# Patient Record
Sex: MALE | Race: BLACK OR AFRICAN AMERICAN | NOT HISPANIC OR LATINO | Employment: OTHER | URBAN - METROPOLITAN AREA
[De-identification: names, ages, dates, MRNs, and addresses within clinical notes are randomized per-mention and may not be internally consistent; named-entity substitution may affect disease eponyms.]

---

## 2019-10-17 ENCOUNTER — TELEPHONE (OUTPATIENT)
Dept: PODIATRY | Facility: CLINIC | Age: 59
End: 2019-10-17

## 2020-08-28 DIAGNOSIS — Z76.89 ESTABLISHING CARE WITH NEW DOCTOR, ENCOUNTER FOR: Primary | ICD-10-CM

## 2020-09-04 ENCOUNTER — HOSPITAL ENCOUNTER (OUTPATIENT)
Dept: CARDIOLOGY | Facility: HOSPITAL | Age: 60
Discharge: HOME | End: 2020-09-04
Attending: INTERNAL MEDICINE
Payer: MEDICARE

## 2020-09-04 ENCOUNTER — OFFICE VISIT (OUTPATIENT)
Dept: CARDIOLOGY | Facility: CLINIC | Age: 60
End: 2020-09-04
Attending: INTERNAL MEDICINE
Payer: MEDICARE

## 2020-09-04 VITALS
OXYGEN SATURATION: 97 % | DIASTOLIC BLOOD PRESSURE: 70 MMHG | WEIGHT: 219.56 LBS | HEART RATE: 64 BPM | SYSTOLIC BLOOD PRESSURE: 116 MMHG

## 2020-09-04 DIAGNOSIS — Z76.89 ESTABLISHING CARE WITH NEW DOCTOR, ENCOUNTER FOR: ICD-10-CM

## 2020-09-04 DIAGNOSIS — Z95.810 ICD (IMPLANTABLE CARDIOVERTER-DEFIBRILLATOR) IN PLACE: ICD-10-CM

## 2020-09-04 DIAGNOSIS — I25.118 CORONARY ARTERY DISEASE OF NATIVE ARTERY OF NATIVE HEART WITH STABLE ANGINA PECTORIS: ICD-10-CM

## 2020-09-04 DIAGNOSIS — I50.22 CHRONIC SYSTOLIC CONGESTIVE HEART FAILURE: ICD-10-CM

## 2020-09-04 DIAGNOSIS — Z95.810 ICD (IMPLANTABLE CARDIOVERTER-DEFIBRILLATOR) IN PLACE: Primary | ICD-10-CM

## 2020-09-04 DIAGNOSIS — F14.10 COCAINE ABUSE: ICD-10-CM

## 2020-09-04 DIAGNOSIS — I23.6 LV (LEFT VENTRICULAR) MURAL THROMBUS FOLLOWING MI: ICD-10-CM

## 2020-09-04 PROCEDURE — 93010 EKG 12-LEAD: ICD-10-PCS | Mod: ,,, | Performed by: INTERNAL MEDICINE

## 2020-09-04 PROCEDURE — 99999 PR PBB SHADOW E&M-EST. PATIENT-LVL III: ICD-10-PCS | Mod: PBBFAC,,, | Performed by: INTERNAL MEDICINE

## 2020-09-04 PROCEDURE — 93010 ELECTROCARDIOGRAM REPORT: CPT | Mod: ,,, | Performed by: INTERNAL MEDICINE

## 2020-09-04 PROCEDURE — 99999 PR PBB SHADOW E&M-EST. PATIENT-LVL III: CPT | Mod: PBBFAC,,, | Performed by: INTERNAL MEDICINE

## 2020-09-04 PROCEDURE — 99204 OFFICE O/P NEW MOD 45 MIN: CPT | Mod: S$GLB,,, | Performed by: INTERNAL MEDICINE

## 2020-09-04 PROCEDURE — 93005 ELECTROCARDIOGRAM TRACING: CPT

## 2020-09-04 PROCEDURE — 99204 PR OFFICE/OUTPT VISIT, NEW, LEVL IV, 45-59 MIN: ICD-10-PCS | Mod: S$GLB,,, | Performed by: INTERNAL MEDICINE

## 2020-09-04 RX ORDER — CITALOPRAM 20 MG/1
20 TABLET, FILM COATED ORAL
COMMUNITY
Start: 2019-12-12 | End: 2020-09-04 | Stop reason: SDUPTHER

## 2020-09-04 RX ORDER — CARVEDILOL 25 MG/1
TABLET ORAL
COMMUNITY
Start: 2015-02-23 | End: 2021-01-11 | Stop reason: SDUPTHER

## 2020-09-04 RX ORDER — FLUTICASONE PROPIONATE 50 MCG
SPRAY, SUSPENSION (ML) NASAL
COMMUNITY
Start: 2017-02-10

## 2020-09-04 RX ORDER — TAMSULOSIN HYDROCHLORIDE 0.4 MG/1
0.4 CAPSULE ORAL
COMMUNITY
Start: 2019-12-12

## 2020-09-04 RX ORDER — ASPIRIN 81 MG/1
81 TABLET ORAL
COMMUNITY

## 2020-09-04 RX ORDER — ESCITALOPRAM OXALATE 20 MG/1
20 TABLET ORAL DAILY
COMMUNITY
Start: 2020-08-21

## 2020-09-04 RX ORDER — CETIRIZINE HYDROCHLORIDE 10 MG/1
10 TABLET ORAL
COMMUNITY
End: 2020-09-04 | Stop reason: SDUPTHER

## 2020-09-04 RX ORDER — ATORVASTATIN CALCIUM 40 MG/1
40 TABLET, FILM COATED ORAL
COMMUNITY
Start: 2016-10-13 | End: 2021-05-28

## 2020-09-04 RX ORDER — TALC
3 POWDER (GRAM) TOPICAL
COMMUNITY
Start: 2019-03-29

## 2020-09-04 RX ORDER — HYDROXYZINE HYDROCHLORIDE 50 MG/1
50 TABLET, FILM COATED ORAL
COMMUNITY
Start: 2013-10-02

## 2020-09-22 ENCOUNTER — HOSPITAL ENCOUNTER (OUTPATIENT)
Dept: CARDIOLOGY | Facility: HOSPITAL | Age: 60
Discharge: HOME | End: 2020-09-22
Attending: INTERNAL MEDICINE
Payer: MEDICARE

## 2020-09-22 VITALS
SYSTOLIC BLOOD PRESSURE: 116 MMHG | BODY MASS INDEX: 28.76 KG/M2 | HEIGHT: 73 IN | HEART RATE: 65 BPM | WEIGHT: 217 LBS | DIASTOLIC BLOOD PRESSURE: 70 MMHG

## 2020-09-22 DIAGNOSIS — Z95.810 ICD (IMPLANTABLE CARDIOVERTER-DEFIBRILLATOR) IN PLACE: ICD-10-CM

## 2020-09-22 DIAGNOSIS — I50.22 CHRONIC SYSTOLIC CONGESTIVE HEART FAILURE: ICD-10-CM

## 2020-09-22 DIAGNOSIS — I25.118 CORONARY ARTERY DISEASE OF NATIVE ARTERY OF NATIVE HEART WITH STABLE ANGINA PECTORIS: ICD-10-CM

## 2020-09-22 LAB
AORTIC ROOT ANNULUS: 3.37 CM
ASCENDING AORTA: 2.91 CM
AV INDEX (PROSTH): 0.86
AV MEAN GRADIENT: 3 MMHG
AV PEAK GRADIENT: 6 MMHG
AV VALVE AREA: 3.3 CM2
AV VELOCITY RATIO: 0.85
BSA FOR ECHO PROCEDURE: 2.25 M2
CV ECHO LV RWT: 0.38 CM
DOP CALC AO PEAK VEL: 1.19 M/S
DOP CALC AO VTI: 24.59 CM
DOP CALC LVOT AREA: 3.8 CM2
DOP CALC LVOT DIAMETER: 2.21 CM
DOP CALC LVOT PEAK VEL: 1.01 M/S
DOP CALC LVOT STROKE VOLUME: 81.05 CM3
DOP CALC RVOT PEAK VEL: 0.76 M/S
DOP CALC RVOT VTI: 15.94 CM
DOP CALCLVOT PEAK VEL VTI: 21.14 CM
E WAVE DECELERATION TIME: 177.16 MSEC
E/A RATIO: 0.97
E/E' RATIO: 9.29 M/S
ECHO LV POSTERIOR WALL: 1.05 CM (ref 0.6–1.1)
FRACTIONAL SHORTENING: 22 % (ref 28–44)
INTERVENTRICULAR SEPTUM: 1.18 CM (ref 0.6–1.1)
IVRT: 94.2 MSEC
LA MAJOR: 5.19 CM
LA MINOR: 4.81 CM
LA WIDTH: 4.1 CM
LEFT ATRIUM SIZE: 3.8 CM
LEFT ATRIUM VOLUME INDEX: 29.7 ML/M2
LEFT ATRIUM VOLUME: 66.12 CM3
LEFT INTERNAL DIMENSION IN SYSTOLE: 4.24 CM (ref 2.1–4)
LEFT VENTRICLE DIASTOLIC VOLUME INDEX: 65.25 ML/M2
LEFT VENTRICLE DIASTOLIC VOLUME: 145.36 ML
LEFT VENTRICLE MASS INDEX: 110 G/M2
LEFT VENTRICLE SYSTOLIC VOLUME INDEX: 36.1 ML/M2
LEFT VENTRICLE SYSTOLIC VOLUME: 80.34 ML
LEFT VENTRICULAR INTERNAL DIMENSION IN DIASTOLE: 5.47 CM (ref 3.5–6)
LEFT VENTRICULAR MASS: 244.26 G
LV LATERAL E/E' RATIO: 9.29 M/S
LV SEPTAL E/E' RATIO: 9.29 M/S
MV PEAK A VEL: 0.67 M/S
MV PEAK E VEL: 0.65 M/S
MV STENOSIS PRESSURE HALF TIME: 51.38 MS
MV VALVE AREA P 1/2 METHOD: 4.28 CM2
PISA TR MAX VEL: 2.44 M/S
PULM VEIN S/D RATIO: 1.13
PV MEAN GRADIENT: 1.42 MMHG
PV PEAK D VEL: 0.47 M/S
PV PEAK S VEL: 0.53 M/S
PV PEAK VELOCITY: 1.02 CM/S
RA MAJOR: 4.84 CM
RA PRESSURE: 3 MMHG
RA WIDTH: 4.08 CM
RIGHT VENTRICULAR END-DIASTOLIC DIMENSION: 4.18 CM
SINUS: 3.59 CM
STJ: 2.73 CM
TDI LATERAL: 0.07 M/S
TDI SEPTAL: 0.07 M/S
TDI: 0.07 M/S
TR MAX PG: 24 MMHG
TV REST PULMONARY ARTERY PRESSURE: 27 MMHG

## 2020-09-22 PROCEDURE — 93283 PRGRMG EVAL IMPLANTABLE DFB: CPT | Mod: 26,,, | Performed by: INTERNAL MEDICINE

## 2020-09-22 PROCEDURE — 93306 TTE W/DOPPLER COMPLETE: CPT | Mod: 26,,, | Performed by: INTERNAL MEDICINE

## 2020-09-22 PROCEDURE — 93283 CARDIAC DEVICE CHECK - IN CLINIC & HOSPITAL: ICD-10-PCS | Mod: 26,,, | Performed by: INTERNAL MEDICINE

## 2020-09-22 PROCEDURE — 93283 PRGRMG EVAL IMPLANTABLE DFB: CPT

## 2020-09-22 PROCEDURE — 93306 ECHO (CUPID ONLY): ICD-10-PCS | Mod: 26,,, | Performed by: INTERNAL MEDICINE

## 2020-09-22 PROCEDURE — 93306 TTE W/DOPPLER COMPLETE: CPT

## 2020-09-23 LAB
AV DELAY - LONGEST: 225 MSEC
AV DELAY - SHORTEST: 200 MSEC
CHARGE TIME (SEC): 8.3 SEC
HV IMPEDANCE (OHM): 48 OHM
IMPEDANCE RA LEAD (NATIVE): 390 OHMS
IMPEDANCE RA LEAD: 530 OHMS
OHS CV DC PP MS1: 0.5 MS
OHS CV DC PP MS2: 0.5 MS
OHS CV DC PP V1: 2 V
OHS CV DC PP V2: 2 V
P/R-WAVE RA LEAD (NATIVE): 11 MV
P/R-WAVE RA LEAD: 2.8 MV
THRESHOLD MS RA LEAD (NATIVE): 0.5 MS
THRESHOLD MS RA LEAD: 0.5 MS
THRESHOLD V RA LEAD (NATIVE): 1 V
THRESHOLD V RA LEAD: 0.75 V

## 2020-09-24 ENCOUNTER — TELEPHONE (OUTPATIENT)
Dept: CARDIOLOGY | Facility: CLINIC | Age: 60
End: 2020-09-24

## 2020-09-24 NOTE — TELEPHONE ENCOUNTER
Pt was contacted about results :    ECHO SHOWED EF25% AND MILD AI     Instructed pt to CONTINUE CURRENT Rx AND F/U AS SCHEDULED. Pt verbalized understanding with no questions or concerns.

## 2020-09-24 NOTE — TELEPHONE ENCOUNTER
Pt was contacted about Lipid/LDL results. Pt verbalized understanding with no questions or concerns.         ----- Message from Kavon Ferrari MD sent at 9/23/2020  9:21 PM CDT -----  LIPID CONTROLLED   LDL 72

## 2020-09-28 ENCOUNTER — PATIENT MESSAGE (OUTPATIENT)
Dept: CARDIOLOGY | Facility: CLINIC | Age: 60
End: 2020-09-28

## 2020-11-20 ENCOUNTER — PATIENT MESSAGE (OUTPATIENT)
Dept: CARDIOLOGY | Facility: CLINIC | Age: 60
End: 2020-11-20

## 2020-11-23 ENCOUNTER — TELEPHONE (OUTPATIENT)
Dept: CARDIOLOGY | Facility: CLINIC | Age: 60
End: 2020-11-23

## 2020-11-23 NOTE — TELEPHONE ENCOUNTER
It appears that this pt is going to need a clearance to have a colonscopy done on 12-. He will need to be off his blood thinners 5 days prior to procedure.please advise thanks pb

## 2020-11-24 ENCOUNTER — TELEPHONE (OUTPATIENT)
Dept: CARDIOLOGY | Facility: CLINIC | Age: 60
End: 2020-11-24

## 2020-11-24 NOTE — TELEPHONE ENCOUNTER
Ok for the procedure  Ok to hold ASA 7 days and ELiuis 2 days before the procedure and pleas resume post OP

## 2021-01-08 ENCOUNTER — PATIENT MESSAGE (OUTPATIENT)
Dept: CARDIOLOGY | Facility: CLINIC | Age: 61
End: 2021-01-08

## 2021-01-11 DIAGNOSIS — I50.22 CHRONIC SYSTOLIC CONGESTIVE HEART FAILURE: ICD-10-CM

## 2021-01-11 DIAGNOSIS — Z95.810 ICD (IMPLANTABLE CARDIOVERTER-DEFIBRILLATOR) IN PLACE: Primary | ICD-10-CM

## 2021-01-11 RX ORDER — CARVEDILOL 25 MG/1
25 TABLET ORAL 2 TIMES DAILY
Qty: 180 TABLET | Refills: 3 | Status: SHIPPED | OUTPATIENT
Start: 2021-01-11 | End: 2021-12-14 | Stop reason: SDUPTHER

## 2021-03-05 ENCOUNTER — OFFICE VISIT (OUTPATIENT)
Dept: CARDIOLOGY | Facility: CLINIC | Age: 61
End: 2021-03-05
Payer: MEDICARE

## 2021-03-05 ENCOUNTER — HOSPITAL ENCOUNTER (OUTPATIENT)
Dept: CARDIOLOGY | Facility: HOSPITAL | Age: 61
Discharge: HOME | End: 2021-03-05
Attending: INTERNAL MEDICINE
Payer: MEDICARE

## 2021-03-05 VITALS
SYSTOLIC BLOOD PRESSURE: 124 MMHG | BODY MASS INDEX: 29.79 KG/M2 | OXYGEN SATURATION: 99 % | WEIGHT: 224.75 LBS | DIASTOLIC BLOOD PRESSURE: 70 MMHG | HEART RATE: 89 BPM | HEIGHT: 73 IN

## 2021-03-05 DIAGNOSIS — Z95.810 ICD (IMPLANTABLE CARDIOVERTER-DEFIBRILLATOR) IN PLACE: ICD-10-CM

## 2021-03-05 DIAGNOSIS — I23.6 LV (LEFT VENTRICULAR) MURAL THROMBUS FOLLOWING MI: ICD-10-CM

## 2021-03-05 DIAGNOSIS — I25.118 CORONARY ARTERY DISEASE OF NATIVE ARTERY OF NATIVE HEART WITH STABLE ANGINA PECTORIS: ICD-10-CM

## 2021-03-05 DIAGNOSIS — I50.22 CHRONIC SYSTOLIC CONGESTIVE HEART FAILURE: Primary | ICD-10-CM

## 2021-03-05 DIAGNOSIS — I50.22 CHRONIC SYSTOLIC CONGESTIVE HEART FAILURE: ICD-10-CM

## 2021-03-05 DIAGNOSIS — F14.10 COCAINE ABUSE: ICD-10-CM

## 2021-03-05 LAB
AV DELAY - LONGEST: 225 MSEC
AV DELAY - SHORTEST: 200 MSEC
CHARGE TIME (SEC): 8.4 SEC
HV IMPEDANCE (OHM): 50 OHM
IMPEDANCE RA LEAD (NATIVE): 360 OHMS
IMPEDANCE RA LEAD: 510 OHMS
OHS CV DC PP MS1: 0.5 MS
OHS CV DC PP MS2: 0.5 MS
OHS CV DC PP V1: NORMAL V
OHS CV DC PP V2: NORMAL V
P/R-WAVE RA LEAD (NATIVE): 9.9 MV
P/R-WAVE RA LEAD: 1.5 MV
THRESHOLD MS RA LEAD (NATIVE): 0.5 MS
THRESHOLD MS RA LEAD: 0.5 MS
THRESHOLD V RA LEAD (NATIVE): 0.75 V
THRESHOLD V RA LEAD: 0.75 V

## 2021-03-05 PROCEDURE — 99999 PR PBB SHADOW E&M-EST. PATIENT-LVL IV: ICD-10-PCS | Mod: PBBFAC,,, | Performed by: INTERNAL MEDICINE

## 2021-03-05 PROCEDURE — 1126F AMNT PAIN NOTED NONE PRSNT: CPT | Mod: S$GLB,,, | Performed by: INTERNAL MEDICINE

## 2021-03-05 PROCEDURE — 3008F BODY MASS INDEX DOCD: CPT | Mod: CPTII,S$GLB,, | Performed by: INTERNAL MEDICINE

## 2021-03-05 PROCEDURE — 93283 CARDIAC DEVICE CHECK - IN CLINIC & HOSPITAL: ICD-10-PCS | Mod: 26,,, | Performed by: INTERNAL MEDICINE

## 2021-03-05 PROCEDURE — 93283 PRGRMG EVAL IMPLANTABLE DFB: CPT

## 2021-03-05 PROCEDURE — 99214 OFFICE O/P EST MOD 30 MIN: CPT | Mod: S$GLB,,, | Performed by: INTERNAL MEDICINE

## 2021-03-05 PROCEDURE — 1126F PR PAIN SEVERITY QUANTIFIED, NO PAIN PRESENT: ICD-10-PCS | Mod: S$GLB,,, | Performed by: INTERNAL MEDICINE

## 2021-03-05 PROCEDURE — 99214 PR OFFICE/OUTPT VISIT, EST, LEVL IV, 30-39 MIN: ICD-10-PCS | Mod: S$GLB,,, | Performed by: INTERNAL MEDICINE

## 2021-03-05 PROCEDURE — 99999 PR PBB SHADOW E&M-EST. PATIENT-LVL IV: CPT | Mod: PBBFAC,,, | Performed by: INTERNAL MEDICINE

## 2021-03-05 PROCEDURE — 93283 PRGRMG EVAL IMPLANTABLE DFB: CPT | Mod: 26,,, | Performed by: INTERNAL MEDICINE

## 2021-03-05 PROCEDURE — 3008F PR BODY MASS INDEX (BMI) DOCUMENTED: ICD-10-PCS | Mod: CPTII,S$GLB,, | Performed by: INTERNAL MEDICINE

## 2021-03-15 ENCOUNTER — TELEPHONE (OUTPATIENT)
Dept: RESEARCH | Facility: HOSPITAL | Age: 61
End: 2021-03-15

## 2021-03-15 DIAGNOSIS — Z00.6 RESEARCH EXAM: Primary | ICD-10-CM

## 2021-03-19 ENCOUNTER — RESEARCH ENCOUNTER (OUTPATIENT)
Dept: RESEARCH | Facility: HOSPITAL | Age: 61
End: 2021-03-19

## 2021-03-19 ENCOUNTER — LAB VISIT (OUTPATIENT)
Dept: LAB | Facility: HOSPITAL | Age: 61
End: 2021-03-19
Attending: INTERNAL MEDICINE
Payer: MEDICARE

## 2021-03-19 ENCOUNTER — RESEARCH ENCOUNTER (OUTPATIENT)
Dept: RESEARCH | Facility: CLINIC | Age: 61
End: 2021-03-19
Payer: MEDICARE

## 2021-03-19 VITALS — BODY MASS INDEX: 30.15 KG/M2 | WEIGHT: 227.5 LBS | HEIGHT: 73 IN

## 2021-03-19 DIAGNOSIS — Z00.6 RESEARCH STUDY PATIENT: Primary | ICD-10-CM

## 2021-03-19 DIAGNOSIS — Z00.6 RESEARCH EXAM: ICD-10-CM

## 2021-03-19 LAB
DRUG STUDY TEST NAME: NORMAL
DRUG STUDY TEST RESULT: NORMAL

## 2021-03-19 PROCEDURE — 36415 COLL VENOUS BLD VENIPUNCTURE: CPT | Performed by: INTERNAL MEDICINE

## 2021-03-29 ENCOUNTER — CLINICAL SUPPORT (OUTPATIENT)
Dept: CARDIOLOGY | Facility: HOSPITAL | Age: 61
End: 2021-03-29
Payer: MEDICARE

## 2021-03-29 DIAGNOSIS — Z95.810 PRESENCE OF AUTOMATIC (IMPLANTABLE) CARDIAC DEFIBRILLATOR: ICD-10-CM

## 2021-03-29 PROCEDURE — 93295 DEV INTERROG REMOTE 1/2/MLT: CPT | Mod: S$GLB,,, | Performed by: INTERNAL MEDICINE

## 2021-03-29 PROCEDURE — 93295 CARDIAC DEVICE CHECK - REMOTE: ICD-10-PCS | Mod: S$GLB,,, | Performed by: INTERNAL MEDICINE

## 2021-03-29 PROCEDURE — 93296 REM INTERROG EVL PM/IDS: CPT | Performed by: INTERNAL MEDICINE

## 2021-04-16 ENCOUNTER — LAB VISIT (OUTPATIENT)
Dept: LAB | Facility: HOSPITAL | Age: 61
End: 2021-04-16
Attending: INTERNAL MEDICINE
Payer: MEDICARE

## 2021-04-16 ENCOUNTER — RESEARCH ENCOUNTER (OUTPATIENT)
Dept: RESEARCH | Facility: CLINIC | Age: 61
End: 2021-04-16
Payer: MEDICARE

## 2021-04-16 DIAGNOSIS — Z00.6 RESEARCH EXAM: ICD-10-CM

## 2021-04-16 LAB
DRUG STUDY TEST NAME: NORMAL
DRUG STUDY TEST RESULT: NORMAL

## 2021-04-16 PROCEDURE — 36415 COLL VENOUS BLD VENIPUNCTURE: CPT | Performed by: INTERNAL MEDICINE

## 2021-05-27 ENCOUNTER — RESEARCH ENCOUNTER (OUTPATIENT)
Dept: RESEARCH | Facility: CLINIC | Age: 61
End: 2021-05-27

## 2021-06-16 ENCOUNTER — LAB VISIT (OUTPATIENT)
Dept: LAB | Facility: HOSPITAL | Age: 61
End: 2021-06-16
Attending: INTERNAL MEDICINE
Payer: MEDICARE

## 2021-06-16 ENCOUNTER — RESEARCH ENCOUNTER (OUTPATIENT)
Dept: RESEARCH | Facility: CLINIC | Age: 61
End: 2021-06-16
Payer: MEDICARE

## 2021-06-16 DIAGNOSIS — Z00.6 RESEARCH EXAM: ICD-10-CM

## 2021-06-16 LAB
DRUG STUDY TEST NAME: NORMAL
DRUG STUDY TEST RESULT: NORMAL

## 2021-06-16 PROCEDURE — 36415 COLL VENOUS BLD VENIPUNCTURE: CPT | Performed by: INTERNAL MEDICINE

## 2021-06-27 ENCOUNTER — CLINICAL SUPPORT (OUTPATIENT)
Dept: CARDIOLOGY | Facility: HOSPITAL | Age: 61
End: 2021-06-27
Payer: MEDICARE

## 2021-06-27 DIAGNOSIS — Z95.810 PRESENCE OF AUTOMATIC (IMPLANTABLE) CARDIAC DEFIBRILLATOR: ICD-10-CM

## 2021-06-27 PROCEDURE — 93295 DEV INTERROG REMOTE 1/2/MLT: CPT | Mod: S$GLB,,, | Performed by: INTERNAL MEDICINE

## 2021-06-27 PROCEDURE — 93296 REM INTERROG EVL PM/IDS: CPT | Performed by: INTERNAL MEDICINE

## 2021-06-27 PROCEDURE — 93295 CARDIAC DEVICE CHECK - REMOTE: ICD-10-PCS | Mod: S$GLB,,, | Performed by: INTERNAL MEDICINE

## 2021-07-07 PROBLEM — F41.1 GAD (GENERALIZED ANXIETY DISORDER): Status: ACTIVE | Noted: 2019-03-29

## 2021-07-30 ENCOUNTER — TELEPHONE (OUTPATIENT)
Dept: CARDIOLOGY | Facility: HOSPITAL | Age: 61
End: 2021-07-30

## 2021-07-30 ENCOUNTER — TELEPHONE (OUTPATIENT)
Dept: CARDIOLOGY | Facility: CLINIC | Age: 61
End: 2021-07-30

## 2021-08-02 ENCOUNTER — TELEPHONE (OUTPATIENT)
Dept: RESEARCH | Facility: HOSPITAL | Age: 61
End: 2021-08-02

## 2021-08-02 ENCOUNTER — DOCUMENTATION ONLY (OUTPATIENT)
Dept: RESEARCH | Facility: HOSPITAL | Age: 61
End: 2021-08-02

## 2021-08-02 ENCOUNTER — LAB VISIT (OUTPATIENT)
Dept: LAB | Facility: HOSPITAL | Age: 61
End: 2021-08-02
Attending: INTERNAL MEDICINE
Payer: MEDICARE

## 2021-08-02 ENCOUNTER — RESEARCH ENCOUNTER (OUTPATIENT)
Dept: RESEARCH | Facility: CLINIC | Age: 61
End: 2021-08-02
Payer: MEDICARE

## 2021-08-02 DIAGNOSIS — Z00.6 RESEARCH EXAM: ICD-10-CM

## 2021-08-02 DIAGNOSIS — Z00.6 RESEARCH EXAM: Primary | ICD-10-CM

## 2021-08-02 LAB
DRUG STUDY TEST NAME: NORMAL
DRUG STUDY TEST RESULT: NORMAL

## 2021-08-02 PROCEDURE — 36415 COLL VENOUS BLD VENIPUNCTURE: CPT | Performed by: INTERNAL MEDICINE

## 2021-08-04 ENCOUNTER — TELEPHONE (OUTPATIENT)
Dept: RESEARCH | Facility: HOSPITAL | Age: 61
End: 2021-08-04

## 2021-08-04 ENCOUNTER — DOCUMENTATION ONLY (OUTPATIENT)
Dept: RESEARCH | Facility: HOSPITAL | Age: 61
End: 2021-08-04

## 2021-08-04 DIAGNOSIS — Z00.6 RESEARCH EXAM: Primary | ICD-10-CM

## 2021-08-13 ENCOUNTER — TELEPHONE (OUTPATIENT)
Dept: CARDIOLOGY | Facility: HOSPITAL | Age: 61
End: 2021-08-13

## 2021-09-24 ENCOUNTER — RESEARCH ENCOUNTER (OUTPATIENT)
Dept: RESEARCH | Facility: HOSPITAL | Age: 61
End: 2021-09-24

## 2021-09-25 ENCOUNTER — CLINICAL SUPPORT (OUTPATIENT)
Dept: CARDIOLOGY | Facility: HOSPITAL | Age: 61
End: 2021-09-25
Payer: MEDICARE

## 2021-09-25 DIAGNOSIS — Z95.810 PRESENCE OF AUTOMATIC (IMPLANTABLE) CARDIAC DEFIBRILLATOR: ICD-10-CM

## 2021-09-25 PROCEDURE — 93295 CARDIAC DEVICE CHECK - REMOTE: ICD-10-PCS | Mod: S$GLB,,, | Performed by: INTERNAL MEDICINE

## 2021-09-25 PROCEDURE — 93295 DEV INTERROG REMOTE 1/2/MLT: CPT | Mod: S$GLB,,, | Performed by: INTERNAL MEDICINE

## 2021-09-25 PROCEDURE — 93296 REM INTERROG EVL PM/IDS: CPT | Performed by: INTERNAL MEDICINE

## 2021-10-01 DIAGNOSIS — Z76.89 ESTABLISHING CARE WITH NEW DOCTOR, ENCOUNTER FOR: Primary | ICD-10-CM

## 2021-10-28 ENCOUNTER — IMMUNIZATION (OUTPATIENT)
Dept: PRIMARY CARE CLINIC | Facility: CLINIC | Age: 61
End: 2021-10-28
Payer: MEDICAID

## 2021-10-28 ENCOUNTER — LAB VISIT (OUTPATIENT)
Dept: LAB | Facility: HOSPITAL | Age: 61
End: 2021-10-28
Attending: INTERNAL MEDICINE
Payer: MEDICARE

## 2021-10-28 ENCOUNTER — RESEARCH ENCOUNTER (OUTPATIENT)
Dept: RESEARCH | Facility: CLINIC | Age: 61
End: 2021-10-28
Payer: MEDICAID

## 2021-10-28 DIAGNOSIS — Z23 NEED FOR VACCINATION: Primary | ICD-10-CM

## 2021-10-28 DIAGNOSIS — Z00.6 RESEARCH EXAM: ICD-10-CM

## 2021-10-28 LAB
DRUG STUDY TEST NAME: NORMAL
DRUG STUDY TEST RESULT: NORMAL

## 2021-10-28 PROCEDURE — 99000 SPECIMEN HANDLING OFFICE-LAB: CPT | Performed by: INTERNAL MEDICINE

## 2021-10-28 PROCEDURE — 0031A COVID-19,VECTOR-NR,RS-AD26,PF,0.5 ML DOSE VACCINE (JANSSEN): CPT | Mod: PBBFAC,CV19 | Performed by: FAMILY MEDICINE

## 2021-11-05 ENCOUNTER — TELEPHONE (OUTPATIENT)
Dept: CARDIOLOGY | Facility: CLINIC | Age: 61
End: 2021-11-05
Payer: MEDICAID

## 2021-11-05 DIAGNOSIS — I50.22 CHRONIC SYSTOLIC CONGESTIVE HEART FAILURE: Primary | ICD-10-CM

## 2021-11-28 ENCOUNTER — PATIENT MESSAGE (OUTPATIENT)
Dept: CARDIOLOGY | Facility: CLINIC | Age: 61
End: 2021-11-28
Payer: MEDICAID

## 2021-12-14 ENCOUNTER — HOSPITAL ENCOUNTER (OUTPATIENT)
Dept: CARDIOLOGY | Facility: HOSPITAL | Age: 61
Discharge: HOME | End: 2021-12-14
Attending: INTERNAL MEDICINE
Payer: MEDICARE

## 2021-12-14 ENCOUNTER — HOSPITAL ENCOUNTER (OUTPATIENT)
Dept: RADIOLOGY | Facility: HOSPITAL | Age: 61
Discharge: HOME | End: 2021-12-14
Attending: INTERNAL MEDICINE
Payer: MEDICARE

## 2021-12-14 ENCOUNTER — OFFICE VISIT (OUTPATIENT)
Dept: CARDIOLOGY | Facility: CLINIC | Age: 61
End: 2021-12-14
Payer: MEDICARE

## 2021-12-14 VITALS
WEIGHT: 220 LBS | BODY MASS INDEX: 29.03 KG/M2 | OXYGEN SATURATION: 98 % | HEART RATE: 92 BPM | SYSTOLIC BLOOD PRESSURE: 132 MMHG | DIASTOLIC BLOOD PRESSURE: 70 MMHG

## 2021-12-14 DIAGNOSIS — I50.22 CHRONIC SYSTOLIC CONGESTIVE HEART FAILURE: ICD-10-CM

## 2021-12-14 DIAGNOSIS — Z95.810 ICD (IMPLANTABLE CARDIOVERTER-DEFIBRILLATOR) IN PLACE: ICD-10-CM

## 2021-12-14 DIAGNOSIS — I48.0 PAROXYSMAL ATRIAL FIBRILLATION: ICD-10-CM

## 2021-12-14 DIAGNOSIS — R05.9 COUGH: ICD-10-CM

## 2021-12-14 DIAGNOSIS — R07.89 OTHER CHEST PAIN: ICD-10-CM

## 2021-12-14 DIAGNOSIS — F14.10 COCAINE ABUSE: ICD-10-CM

## 2021-12-14 DIAGNOSIS — I25.118 CORONARY ARTERY DISEASE OF NATIVE ARTERY OF NATIVE HEART WITH STABLE ANGINA PECTORIS: Primary | ICD-10-CM

## 2021-12-14 DIAGNOSIS — Z76.89 ESTABLISHING CARE WITH NEW DOCTOR, ENCOUNTER FOR: ICD-10-CM

## 2021-12-14 PROCEDURE — 99999 PR PBB SHADOW E&M-EST. PATIENT-LVL III: CPT | Mod: PBBFAC,,, | Performed by: INTERNAL MEDICINE

## 2021-12-14 PROCEDURE — 99214 OFFICE O/P EST MOD 30 MIN: CPT | Mod: S$GLB,,, | Performed by: INTERNAL MEDICINE

## 2021-12-14 PROCEDURE — 4010F ACE/ARB THERAPY RXD/TAKEN: CPT | Mod: CPTII,S$GLB,, | Performed by: INTERNAL MEDICINE

## 2021-12-14 PROCEDURE — 93005 ELECTROCARDIOGRAM TRACING: CPT

## 2021-12-14 PROCEDURE — 4010F PR ACE/ARB THEARPY RXD/TAKEN: ICD-10-PCS | Mod: CPTII,S$GLB,, | Performed by: INTERNAL MEDICINE

## 2021-12-14 PROCEDURE — 99999 PR PBB SHADOW E&M-EST. PATIENT-LVL III: ICD-10-PCS | Mod: PBBFAC,,, | Performed by: INTERNAL MEDICINE

## 2021-12-14 PROCEDURE — 71046 XR CHEST PA AND LATERAL: ICD-10-PCS | Mod: 26,,, | Performed by: RADIOLOGY

## 2021-12-14 PROCEDURE — 71046 X-RAY EXAM CHEST 2 VIEWS: CPT | Mod: TC

## 2021-12-14 PROCEDURE — 93010 ELECTROCARDIOGRAM REPORT: CPT | Mod: ,,, | Performed by: INTERNAL MEDICINE

## 2021-12-14 PROCEDURE — 99214 PR OFFICE/OUTPT VISIT, EST, LEVL IV, 30-39 MIN: ICD-10-PCS | Mod: S$GLB,,, | Performed by: INTERNAL MEDICINE

## 2021-12-14 PROCEDURE — 93010 EKG 12-LEAD: ICD-10-PCS | Mod: ,,, | Performed by: INTERNAL MEDICINE

## 2021-12-14 PROCEDURE — 71046 X-RAY EXAM CHEST 2 VIEWS: CPT | Mod: 26,,, | Performed by: RADIOLOGY

## 2021-12-14 RX ORDER — CARVEDILOL 12.5 MG/1
12.5 TABLET ORAL 2 TIMES DAILY
Qty: 180 TABLET | Refills: 2 | Status: SHIPPED | OUTPATIENT
Start: 2021-12-14 | End: 2022-10-21

## 2021-12-14 RX ORDER — LEVOFLOXACIN 500 MG/1
500 TABLET, FILM COATED ORAL DAILY
Qty: 10 TABLET | Refills: 0 | Status: SHIPPED | OUTPATIENT
Start: 2021-12-14 | End: 2021-12-24

## 2021-12-14 RX ORDER — ATORVASTATIN CALCIUM 40 MG/1
40 TABLET, FILM COATED ORAL DAILY
Qty: 90 TABLET | Refills: 2 | Status: SHIPPED | OUTPATIENT
Start: 2021-12-14 | End: 2022-12-14

## 2021-12-15 ENCOUNTER — TELEPHONE (OUTPATIENT)
Dept: CARDIOLOGY | Facility: CLINIC | Age: 61
End: 2021-12-15
Payer: MEDICAID

## 2021-12-24 ENCOUNTER — CLINICAL SUPPORT (OUTPATIENT)
Dept: CARDIOLOGY | Facility: HOSPITAL | Age: 61
End: 2021-12-24
Payer: MEDICARE

## 2021-12-24 DIAGNOSIS — Z95.810 PRESENCE OF AUTOMATIC (IMPLANTABLE) CARDIAC DEFIBRILLATOR: ICD-10-CM

## 2021-12-24 PROCEDURE — 93296 REM INTERROG EVL PM/IDS: CPT | Performed by: INTERNAL MEDICINE

## 2021-12-24 PROCEDURE — 93295 CARDIAC DEVICE CHECK - REMOTE: ICD-10-PCS | Mod: S$GLB,,, | Performed by: INTERNAL MEDICINE

## 2021-12-24 PROCEDURE — 93295 DEV INTERROG REMOTE 1/2/MLT: CPT | Mod: S$GLB,,, | Performed by: INTERNAL MEDICINE

## 2022-01-11 ENCOUNTER — OFFICE VISIT (OUTPATIENT)
Dept: CARDIOLOGY | Facility: CLINIC | Age: 62
End: 2022-01-11
Payer: MEDICAID

## 2022-01-11 VITALS
BODY MASS INDEX: 28.21 KG/M2 | DIASTOLIC BLOOD PRESSURE: 72 MMHG | OXYGEN SATURATION: 97 % | SYSTOLIC BLOOD PRESSURE: 116 MMHG | HEART RATE: 77 BPM | WEIGHT: 213.88 LBS

## 2022-01-11 DIAGNOSIS — F14.10 COCAINE ABUSE: ICD-10-CM

## 2022-01-11 DIAGNOSIS — I25.118 CORONARY ARTERY DISEASE OF NATIVE ARTERY OF NATIVE HEART WITH STABLE ANGINA PECTORIS: ICD-10-CM

## 2022-01-11 DIAGNOSIS — I48.0 PAROXYSMAL ATRIAL FIBRILLATION: ICD-10-CM

## 2022-01-11 DIAGNOSIS — I23.6 LV (LEFT VENTRICULAR) MURAL THROMBUS FOLLOWING MI: ICD-10-CM

## 2022-01-11 DIAGNOSIS — Z95.810 ICD (IMPLANTABLE CARDIOVERTER-DEFIBRILLATOR) IN PLACE: ICD-10-CM

## 2022-01-11 DIAGNOSIS — I50.22 CHRONIC SYSTOLIC CONGESTIVE HEART FAILURE: Primary | ICD-10-CM

## 2022-01-11 PROCEDURE — 99214 OFFICE O/P EST MOD 30 MIN: CPT | Mod: S$GLB,,, | Performed by: INTERNAL MEDICINE

## 2022-01-11 PROCEDURE — 1160F PR REVIEW ALL MEDS BY PRESCRIBER/CLIN PHARMACIST DOCUMENTED: ICD-10-PCS | Mod: CPTII,S$GLB,, | Performed by: INTERNAL MEDICINE

## 2022-01-11 PROCEDURE — 3078F DIAST BP <80 MM HG: CPT | Mod: CPTII,S$GLB,, | Performed by: INTERNAL MEDICINE

## 2022-01-11 PROCEDURE — 99999 PR PBB SHADOW E&M-EST. PATIENT-LVL III: CPT | Mod: PBBFAC,,, | Performed by: INTERNAL MEDICINE

## 2022-01-11 PROCEDURE — 1159F PR MEDICATION LIST DOCUMENTED IN MEDICAL RECORD: ICD-10-PCS | Mod: CPTII,S$GLB,, | Performed by: INTERNAL MEDICINE

## 2022-01-11 PROCEDURE — 99214 PR OFFICE/OUTPT VISIT, EST, LEVL IV, 30-39 MIN: ICD-10-PCS | Mod: S$GLB,,, | Performed by: INTERNAL MEDICINE

## 2022-01-11 PROCEDURE — 3008F BODY MASS INDEX DOCD: CPT | Mod: CPTII,S$GLB,, | Performed by: INTERNAL MEDICINE

## 2022-01-11 PROCEDURE — 3078F PR MOST RECENT DIASTOLIC BLOOD PRESSURE < 80 MM HG: ICD-10-PCS | Mod: CPTII,S$GLB,, | Performed by: INTERNAL MEDICINE

## 2022-01-11 PROCEDURE — 3074F SYST BP LT 130 MM HG: CPT | Mod: CPTII,S$GLB,, | Performed by: INTERNAL MEDICINE

## 2022-01-11 PROCEDURE — 1159F MED LIST DOCD IN RCRD: CPT | Mod: CPTII,S$GLB,, | Performed by: INTERNAL MEDICINE

## 2022-01-11 PROCEDURE — 99999 PR PBB SHADOW E&M-EST. PATIENT-LVL III: ICD-10-PCS | Mod: PBBFAC,,, | Performed by: INTERNAL MEDICINE

## 2022-01-11 PROCEDURE — 3074F PR MOST RECENT SYSTOLIC BLOOD PRESSURE < 130 MM HG: ICD-10-PCS | Mod: CPTII,S$GLB,, | Performed by: INTERNAL MEDICINE

## 2022-01-11 PROCEDURE — 1160F RVW MEDS BY RX/DR IN RCRD: CPT | Mod: CPTII,S$GLB,, | Performed by: INTERNAL MEDICINE

## 2022-01-11 PROCEDURE — 3008F PR BODY MASS INDEX (BMI) DOCUMENTED: ICD-10-PCS | Mod: CPTII,S$GLB,, | Performed by: INTERNAL MEDICINE

## 2022-01-11 PROCEDURE — 99213 OFFICE O/P EST LOW 20 MIN: CPT | Mod: PBBFAC | Performed by: INTERNAL MEDICINE

## 2022-01-11 RX ORDER — SPIRONOLACTONE 25 MG/1
25 TABLET ORAL DAILY
Qty: 30 TABLET | Refills: 11 | Status: SHIPPED | OUTPATIENT
Start: 2022-01-11 | End: 2022-02-10

## 2022-01-11 NOTE — PROGRESS NOTES
"Subjective:   Patient ID:  Aden Norton is a 61 y.o. male who presents for follow up of No chief complaint on file.      58 yo male, came in for 4 weeks. Prior cardiologist at [facility], Dr. Ciera Castrejon  [facility] CAD s/p PCIs, S/p ICD battery change in . CHFrEF PAF, active cocaine smoking. No tabacco and alcohol.   Lives in a rehab group house.  Per [facility] records: h/o a large MI in 2009 at [place] which resulted in an EF of 20% with a 4.7 x 3 cm apical clot, LAD stent placement, implanted ([facility]) cardiac defibrillator in [place] in 2011 (no ICD shocks); In 2013 had another LAD stent His 9/15/15 at [facility] (DR Cindy Fermin) showed: Enlarged LV; occluded LAD stent, distal LAD fills retrogradely via LCX, which itself is OK, RCA OK. RA=19/13/12, sats 65%; RV=34/5/11; PA= 28/12; sats 65%Ur=406/75/95 sats 95%; VR=490/15/29; C.I. =1.17 L/min/m2.   No DmMand stroke  Last stress test in    EKG a pacing and PVC  No chest pain, dyspnea palpitation, dizziness faint  Decent exercise  Med compliance  Dependent medical transportation     visit c/o BP dropped to 86 mmHG for few hours when taking coreg and Entresto  No chest pain dyspnea palpitation., no leg swelling  Walks a lot.     12/2021 visit   ECHO EF 25% and mild AI  Two weeks ago, had cold and congestion. Coughing a lot. Sputum clear and green. Recent episode of his HR was "170" and his BP "201/100 high.  Went to ER OLOL and CXR base space disease.   and troponin 0.05, Given Lasix IVP.  F/u at PCP and repeat CXR clear.   ICD interrogation in  showed 1% AF burden  Still a lot of cough and chest pain  EKG NSR LAE LAFB  Pt used crack smoking/Cocaine one week before the cold. Used 3 times in 3 months    Interval history  PNA improved after levaquin rx and now some residual coughing and sputum. Improved SOB. Started to walk. No active bleeding  No leg swelling orthopnea pND and chest pain. Weight " loss      03/22/2022  Increase Micardia to 80 mg daily  Increase Hydralazine to 100 mg 3 times a day  Increazse Labetalol to 400 gm twice a day  Add Isodil 20 mg 3 times day  Contiune Amlodipine 10 mg daily  Add Clonidine 0.1 mg bid as needed ONLY SBP > 180 mmHg    History reviewed. No pertinent past medical history.    Past Surgical History:   Procedure Laterality Date    INSERT / REPLACE / REMOVE PACEMAKER         Social History     Tobacco Use    Smoking status: Never Smoker    Smokeless tobacco: Never Used   Substance Use Topics    Alcohol use: Never       Family History   Problem Relation Age of Onset    Heart attack Father          Review of Systems   Constitutional: Negative for decreased appetite, diaphoresis, fever, malaise/fatigue and night sweats.   HENT: Negative for nosebleeds.    Eyes: Negative for blurred vision and double vision.   Cardiovascular: Negative for chest pain, claudication, dyspnea on exertion, irregular heartbeat, leg swelling, near-syncope, orthopnea, palpitations, paroxysmal nocturnal dyspnea and syncope.   Respiratory: Negative for cough, shortness of breath, sleep disturbances due to breathing, snoring, sputum production and wheezing.    Endocrine: Negative for cold intolerance and polyuria.   Hematologic/Lymphatic: Does not bruise/bleed easily.   Skin: Negative for rash.   Musculoskeletal: Negative for back pain, falls, joint pain, joint swelling and neck pain.   Gastrointestinal: Negative for abdominal pain, heartburn, nausea and vomiting.   Genitourinary: Negative for dysuria, frequency and hematuria.   Neurological: Negative for difficulty with concentration, dizziness, focal weakness, headaches, light-headedness, numbness, seizures and weakness.   Psychiatric/Behavioral: Negative for depression, memory loss and substance abuse. The patient does not have insomnia.    Allergic/Immunologic: Negative for HIV exposure and hives.       Objective:   Physical Exam  HENT:      Head:  Normocephalic.   Eyes:      Pupils: Pupils are equal, round, and reactive to light.   Neck:      Thyroid: No thyromegaly.      Vascular: Normal carotid pulses. No carotid bruit or JVD.   Cardiovascular:      Rate and Rhythm: Normal rate and regular rhythm.  No extrasystoles are present.     Chest Wall: PMI is not displaced.      Pulses: Normal pulses.      Heart sounds: Normal heart sounds. No murmur heard.  No gallop. No S3 sounds.    Pulmonary:      Effort: No respiratory distress.      Breath sounds: No stridor. Examination of the right-lower field reveals rales. Rales present.   Chest:   Breasts:      Right: Tenderness (right lower rib cage) present.       Abdominal:      General: Bowel sounds are normal.      Palpations: Abdomen is soft.      Tenderness: There is no abdominal tenderness. There is no rebound.   Musculoskeletal:         General: Normal range of motion.   Skin:     Findings: No rash.   Neurological:      Mental Status: He is alert and oriented to person, place, and time.   Psychiatric:         Behavior: Behavior normal.         Lab Results   Component Value Date    CHOL 122 09/22/2020     Lab Results   Component Value Date    HDL 36 (L) 09/22/2020     Lab Results   Component Value Date    LDLCALC 72.8 09/22/2020     Lab Results   Component Value Date    TRIG 66 09/22/2020     Lab Results   Component Value Date    CHOLHDL 29.5 09/22/2020       Chemistry        Component Value Date/Time     09/22/2020 0849    K 4.2 09/22/2020 0849     09/22/2020 0849    CO2 23 09/22/2020 0849    BUN 14 09/22/2020 0849    CREATININE 1.2 09/22/2020 0849     09/22/2020 0849        Component Value Date/Time    CALCIUM 8.3 (L) 09/22/2020 0849    ALKPHOS 54 (L) 09/22/2020 0849    AST 21 09/22/2020 0849    ALT 19 09/22/2020 0849    BILITOT 0.5 09/22/2020 0849    ESTGFRAFRICA >60.0 09/22/2020 0849    EGFRNONAA >60.0 09/22/2020 0849          No results found for: LABA1C, HGBA1C  No results found for:  TSH  No results found for: INR, PROTIME  Lab Results   Component Value Date    WBC 5.74 09/22/2020    HGB 14.6 09/22/2020    HCT 47.1 09/22/2020    MCV 89 09/22/2020     09/22/2020     BMP  Sodium   Date Value Ref Range Status   09/22/2020 140 136 - 145 mmol/L Final     Potassium   Date Value Ref Range Status   09/22/2020 4.2 3.5 - 5.1 mmol/L Final     Chloride   Date Value Ref Range Status   09/22/2020 108 95 - 110 mmol/L Final     CO2   Date Value Ref Range Status   09/22/2020 23 23 - 29 mmol/L Final     BUN   Date Value Ref Range Status   09/22/2020 14 6 - 20 mg/dL Final     Creatinine   Date Value Ref Range Status   09/22/2020 1.2 0.5 - 1.4 mg/dL Final     Calcium   Date Value Ref Range Status   09/22/2020 8.3 (L) 8.7 - 10.5 mg/dL Final     Anion Gap   Date Value Ref Range Status   09/22/2020 9 8 - 16 mmol/L Final     eGFR if    Date Value Ref Range Status   09/22/2020 >60.0 >60 mL/min/1.73 m^2 Final     eGFR if non    Date Value Ref Range Status   09/22/2020 >60.0 >60 mL/min/1.73 m^2 Final     Comment:     Calculation used to obtain the estimated glomerular filtration  rate (eGFR) is the CKD-EPI equation.        BNP  @LABRCNTIP(BNP,BNPTRIAGEBLO)@  @LABRCNTIP(troponini)@  CrCl cannot be calculated (Patient's most recent lab result is older than the maximum 7 days allowed.).  No results found in the last 24 hours.  No results found in the last 24 hours.  No results found in the last 24 hours.    Assessment:      1. Chronic systolic congestive heart failure    2. LV (left ventricular) mural thrombus following MI    3. Coronary artery disease of native artery of native heart with stable angina pectoris    4. ICD (implantable cardioverter-defibrillator) in place    5. Paroxysmal atrial fibrillation    6. Cocaine abuse      CHFrEF 25% s/p ICD compensated  BP controlled    Plan:   Add aldactone 25 mg for CHFrEF. Will discuss SGLT2 inhibitor at next visit  Continue entresto and  coreg  Continue ASA eliquis and Lipitor    Counseled DASH and fluid restriction  Check Lipid profile in 6 months  Recommend heart-healthy diet, weight control and regular exercise.  Corey. Risk modification.   I have reviewed all pertinent labs and cardiac studies independently. Plans and recommendations have been formulated under my direct supervision. All questions answered and patient voiced understanding.   If symptoms persist go to the ED  RTC in 2 months

## 2022-01-20 ENCOUNTER — TELEPHONE (OUTPATIENT)
Dept: CARDIOLOGY | Facility: CLINIC | Age: 62
End: 2022-01-20
Payer: MEDICAID

## 2022-01-20 NOTE — TELEPHONE ENCOUNTER
"----- Message from Varghese Lay RN sent at 1/20/2022  1:54 PM CST -----  Regarding: RVR  Hi Dr. Ferrari,    I received the following alert on this pt:    "Alert: High V. Rates during AT/AF, Exceeded AT/AF burden, Long AT/AF episodes, PMT. Report reviewed. No new clinically actionable events identified.  209 AMS episodes, AT/AF burden 1.1%. Longest on 1/4/22, 4hr 28min  duration, peak V Rate 194 bpm. No EGM available for this episode. Available EGMs illustrate AF/AFL with RVR. Histograms show predominant V-rates during AMS >160 bpm.  23 Non-sustained episodes, longest on 1/14/22, 12sec duration, average rate 205 bpm. No EGM available for this episode. Available EGMs illustrate AF/AFL with RVR.   Corvue illustrates decrease in TI trend for up to 11 consecutive days, trends have normalized.  AF w/RVR > 110 bpm for > 6 mins."      Longest recorded episode on 1/14/22 was 1hr 4mins, but more likely pt was in same episode and just undersensing AF/AFL for a total of 6.5hrs due to multiple short recordings that do not show and end of atrial event and are undersensing. Pt reports taking Carvedilol and Eliquis. Pt states he was likely sleeping during the events and has been feeling well. Overall v-rates controlled on histogram. I wanted to make you aware.     Thanks,  Varghese Vega    "

## 2022-01-24 ENCOUNTER — OFFICE VISIT (OUTPATIENT)
Dept: OPHTHALMOLOGY | Facility: CLINIC | Age: 62
End: 2022-01-24
Payer: MEDICAID

## 2022-01-24 DIAGNOSIS — H52.4 PRESBYOPIA OF BOTH EYES: ICD-10-CM

## 2022-01-24 DIAGNOSIS — H31.002 CHORIORETINAL SCAR OF LEFT EYE: Primary | ICD-10-CM

## 2022-01-24 PROCEDURE — 92015 PR REFRACTION: ICD-10-PCS | Mod: S$GLB,,, | Performed by: OPTOMETRIST

## 2022-01-24 PROCEDURE — 92004 COMPRE OPH EXAM NEW PT 1/>: CPT | Mod: S$GLB,,, | Performed by: OPTOMETRIST

## 2022-01-24 PROCEDURE — 92250 COLOR FUNDUS PHOTOGRAPHY - OU - BOTH EYES: ICD-10-PCS | Mod: S$GLB,,, | Performed by: OPTOMETRIST

## 2022-01-24 PROCEDURE — 99999 PR PBB SHADOW E&M-EST. PATIENT-LVL III: ICD-10-PCS | Mod: PBBFAC,,, | Performed by: OPTOMETRIST

## 2022-01-24 PROCEDURE — 2023F PR DILATED RETINAL EXAM W/O EVID OF RETINOPATHY: ICD-10-PCS | Mod: CPTII,S$GLB,, | Performed by: OPTOMETRIST

## 2022-01-24 PROCEDURE — 92004 PR EYE EXAM, NEW PATIENT,COMPREHESV: ICD-10-PCS | Mod: S$GLB,,, | Performed by: OPTOMETRIST

## 2022-01-24 PROCEDURE — 99213 OFFICE O/P EST LOW 20 MIN: CPT | Mod: PBBFAC | Performed by: OPTOMETRIST

## 2022-01-24 PROCEDURE — 1159F PR MEDICATION LIST DOCUMENTED IN MEDICAL RECORD: ICD-10-PCS | Mod: CPTII,S$GLB,, | Performed by: OPTOMETRIST

## 2022-01-24 PROCEDURE — 92015 DETERMINE REFRACTIVE STATE: CPT | Mod: S$GLB,,, | Performed by: OPTOMETRIST

## 2022-01-24 PROCEDURE — 2023F DILAT RTA XM W/O RTNOPTHY: CPT | Mod: CPTII,S$GLB,, | Performed by: OPTOMETRIST

## 2022-01-24 PROCEDURE — 1159F MED LIST DOCD IN RCRD: CPT | Mod: CPTII,S$GLB,, | Performed by: OPTOMETRIST

## 2022-01-24 PROCEDURE — 99999 PR PBB SHADOW E&M-EST. PATIENT-LVL III: CPT | Mod: PBBFAC,,, | Performed by: OPTOMETRIST

## 2022-01-24 PROCEDURE — 92250 FUNDUS PHOTOGRAPHY W/I&R: CPT | Mod: PBBFAC | Performed by: OPTOMETRIST

## 2022-01-24 RX ORDER — CARVEDILOL 25 MG/1
12.5 TABLET ORAL
COMMUNITY
End: 2022-10-21

## 2022-01-24 NOTE — PROGRESS NOTES
HPI     NP to DKT   Pt here for annual eye exam.  Last eye exam 2021  Vision changes since last eye exam?: No     Any eye pain today: No    Other ocular symptoms: No    Interested in contact lens fitting today? No    Pt states had an injury to OS when he was a child.            Last edited by Magda Lovett MA on 1/24/2022  9:17 AM. (History)            Assessment /Plan     For exam results, see Encounter Report.    Chorioretinal scar of left eye  -     Color Fundus Photography - OU - Both Eyes  Longstanding, 2/2 eye injury as a child. Observe.     Presbyopia of both eyes  Eyeglass Final Rx     Eyeglass Final Rx       Sphere Dist [facility]    Right +2.25     Left +2.25     Type: Reading only           Eyeglass Final Rx #2       Sphere Dist [facility]    Right [place] +2.50    Left [place] +2.50    Type: PAL              RTC 1 year for CEE with DFE sooner if any changes to vision or worsening symptoms.

## 2022-03-10 DIAGNOSIS — I48.0 PAROXYSMAL ATRIAL FIBRILLATION: Primary | ICD-10-CM

## 2022-03-15 ENCOUNTER — TELEPHONE (OUTPATIENT)
Dept: CARDIOLOGY | Facility: HOSPITAL | Age: 62
End: 2022-03-15
Payer: MEDICAID

## 2022-03-15 ENCOUNTER — PATIENT MESSAGE (OUTPATIENT)
Dept: CARDIOLOGY | Facility: HOSPITAL | Age: 62
End: 2022-03-15
Payer: MEDICAID

## 2022-03-22 ENCOUNTER — TELEPHONE (OUTPATIENT)
Dept: CARDIOLOGY | Facility: CLINIC | Age: 62
End: 2022-03-22
Payer: MEDICAID

## 2022-03-22 DIAGNOSIS — I50.22 CHRONIC SYSTOLIC CONGESTIVE HEART FAILURE: Primary | ICD-10-CM

## 2022-03-22 NOTE — TELEPHONE ENCOUNTER
"----- Message from Varghese Vega RN sent at 3/22/2022  9:10 AM CDT -----  Regarding: ATP  Dr. Quintana, I sent the following report to you in [place].    Dr. Ferrari, this is your patient and the following [place] alert I received:       "Alert: VT/VF episode occurred, Successful ATP therapy delivered  1 VT episode 3/21/22, 18sec recorded duration, average A/V rates 200/201 bpm. EGM illustrates SVT inappropriately treated with ATP x1,  with no onset of tachycardia recorded due to TCL falling below detection. Indeterminate true duration. ATP did decrease A/V rate 141 bpm.    27 AMS episodes, AT/AF burden <1%. Longest on 3/16/22, 55min 16sec duration, average A/V Rate 138 bpm. EGMs illustrate EGM illustrates SVT with no onset or termination of tachycardia recorded. Indeterminate true duration. Burst of pAF noted lasting 1.2sec.     Corvue illustrates decrease in TI trend for 12 consecutive days, trends have normalized.    SVT inappropriately treated with ATP."       Called pt for symptom check. He states he was raking leaves at time of ATP. Pt states he felt fine and had to take a break sometimes. Pt states, "I don't know if I take breaks because I'm just lazy from not doing anything or what. I've been under a lot of stress. My mother had stomach cancer, went on hospice and passed so fast. Her  is next week." Roughly 15% of the time, ventricular rates >110 bpm. Pt reports taking coreg and eliquis. Pt has 2mos f/u to discuss SOB at rest wtih Dr. Ferrari on 22.    Thanks,  Varghese Vega    "

## 2022-03-24 ENCOUNTER — CLINICAL SUPPORT (OUTPATIENT)
Dept: CARDIOLOGY | Facility: HOSPITAL | Age: 62
End: 2022-03-24
Payer: MEDICARE

## 2022-03-24 DIAGNOSIS — Z95.810 PRESENCE OF AUTOMATIC (IMPLANTABLE) CARDIAC DEFIBRILLATOR: ICD-10-CM

## 2022-03-24 PROCEDURE — 93296 REM INTERROG EVL PM/IDS: CPT | Performed by: INTERNAL MEDICINE

## 2022-05-03 ENCOUNTER — TELEPHONE (OUTPATIENT)
Dept: CARDIOLOGY | Facility: HOSPITAL | Age: 62
End: 2022-05-03
Payer: MEDICAID

## 2022-06-14 ENCOUNTER — RESEARCH ENCOUNTER (OUTPATIENT)
Dept: RESEARCH | Facility: CLINIC | Age: 62
End: 2022-06-14
Payer: MEDICAID

## 2022-06-14 ENCOUNTER — LAB VISIT (OUTPATIENT)
Dept: LAB | Facility: HOSPITAL | Age: 62
End: 2022-06-14
Attending: INTERNAL MEDICINE
Payer: MEDICARE

## 2022-06-14 DIAGNOSIS — Z00.6 RESEARCH EXAM: ICD-10-CM

## 2022-06-14 PROCEDURE — 36415 COLL VENOUS BLD VENIPUNCTURE: CPT | Performed by: INTERNAL MEDICINE

## 2022-06-14 NOTE — PROGRESS NOTES
Week 60  Sponsor: [employer]   Study: A Randomized, Double-blind, Placebo-controlled Phase 3 Study to Assess the Efficacy and Safety of Ad26.COV2.S for the Prevention of SARS-CoV-2-mediated COVID-19 in Adults Aged 18 Years and Older   IRB/Protocol #: 2021.025/ [id number]; Phase 3?   Principle Investigator/Sub-Investigator: Dr. Danny Gentile  Site Number: [id number]  Subject Number: [id number]     Informed Consent:   Is this a reconsent?: yes  Consenting was completed in private area using the most current IRB approved version of the ICF dated 14-APR-2022  by myself and patient was given ample time to review consent prior to execution of ICF.    Prior to the Informed Consent (IC) being signed, or any study protocol required data collection, testing, procedure, or intervention being performed, the following was done and/or discussed:?    Patient was given a copy of the IC for review??    Purpose of the study and qualifications to participate??    Study design, follow up schedule, and tests or procedures done at each visit?    Confidentiality and HIPAA Authorization for Release of Medical Records for the research trial/ subject's rights/research related injury?    Risk, Benefits, Alternative Treatments, Compensation and Costs?    Participation in the research trial is voluntary and patient may withdraw at anytime?    Contact information for study related questions?     Patient verbalizes understanding of the above: Yes?   Contact information for CRC and PI given to patient: Yes?   Patient able to adequately summarize: the purpose of the study, the risks associated with the study, and all procedures, testing, and follow-ups associated with the study: Yes?     Aden Norton signed the IRB approved version of informed consent form for the [employer] research study.? Each page of the consent was reviewed with the patient and patient's family) and all  questions answered satisfactorily. The patient signed the paper consent form and received a copy of same (copy of informed consent made and provided to patient). The original consent was scanned into electronic medical records (EPIC).        Vital Signs (Temperature Only):  Vital signs were performed prior to blood draws    Body Temperature:  Was body temperature collected?: yes  Collected by: Queenie Horne  Date of collection: 06/14/2022  Time of collection: 10:15am  Temperature: 97.5 F  Temperature Anatomical Location: Forehead      Blood Sample Collection for Humoral Immunogenicity:   Was the sample collected?: yes  Sample collected by: Queenie Horne  Date of collection: 06/14/2022  Time of collection: 10:00am  Specimen Type: Serum  mL collected: 10 (NON-immuno subset)  Accession Number: [id number]      Baseline and Longitudinal Characteristics for Risk Factor Analysis:  Did the participant consent to respond to questions regarding work, home and social situation?: yes  Are you a student?: no  If Yes - Are you likely to return to school in person in 2020?: N/A  Are you retired?: yes  How often do you go in person to your main workplace (other than work-from-home)?: N/A  Does your main workplace have social distancing measures in place?: N/A  Is your main workplace cleaned on a regular basis?: N/A  Do people in your main workplace use personal protection equipment (such as masks)?: N/A  How do you get to work?: N/A  On a typical day, how many people do you interact with in person at work?: N/A  On a typical day, how many people do you interact with in person outside of work?: Between 1 and 10 people    Living Situation:  Do you live in any of the following?: Single family home  How many people do you live with (other than yourself?:4   Total people under 18 years of age: 0   Total people between 18-64 years of age: 5   Total people over 65 years of age: 0  Are any of the people you live with expected  to return to school in person in ?: No    Community Interactions:  In the last 2 weeks, have you attended any gatherings with more than 10 people? (e.g., Jew, party, concert, wedding, , demonstration or other event).: No  If yes, approximately how many people were at the largest gathering?: N/A  Was the gathering an indoor or outdoor event?: N/A  How frequently do you have visitors in your residence including people completing work inside?: Weekly  Over the past month, have you been in close contact with anyone that tested positive for COVID-19?: No  If yes, is this person someone that you live with?: No       End of Visit:    Next visit has been scheduled for 2022 at 10am. I have expressed importance of returning on scheduling visit date and time.

## 2022-06-16 LAB
DRUG STUDY TEST NAME: NORMAL
DRUG STUDY TEST RESULT: NORMAL

## 2022-10-12 ENCOUNTER — TELEPHONE (OUTPATIENT)
Dept: RESEARCH | Facility: CLINIC | Age: 62
End: 2022-10-12
Payer: MEDICAID

## 2022-10-12 NOTE — TELEPHONE ENCOUNTER
Sponsor: [employer]   Study: A Randomized, Double-blind, Placebo-controlled Phase 3 Study to Assess the Efficacy and Safety of Ad26.COV2.S for the Prevention of SARS-CoV-2-mediated COVID-19 in Adults Aged 18 Years and Older   IRB/Protocol #: 2021.025/ [id number]; Phase 3?   Principle Investigator/Sub-Investigator: Dr. Wilfred Quispe  Site Number: [id number]  Location: [place]  Subject Number: [id number]      Participant contacted site to find out where he is on the trial and if he is still on the study. I expressed to the participant that they are still on the trial and that they should be expecting a call from the [place] Coordinator regarding their status and their next steps. Participant expressed understanding. Site staff thanked participant and ended the call.

## 2022-10-17 ENCOUNTER — DOCUMENTATION ONLY (OUTPATIENT)
Dept: RESEARCH | Facility: HOSPITAL | Age: 62
End: 2022-10-17
Payer: MEDICAID

## 2022-10-17 NOTE — PROGRESS NOTES
Sponsor: [employer]   Study: A Randomized, Double-blind, Placebo-controlled Phase 3 Study to Assess the Efficacy and Safety of Ad26.COV2.S for the Prevention of SARS-CoV-2-mediated COVID-19 in Adults Aged 18 Years and Older   IRB/Protocol #: 2021.025/ [id number]; Phase 3?   Principle Investigator/Sub-Investigator: Dr. Danny Gentile  Site Number: [id number]  Location: [place]  Subject Number: [id number]    Protocol Amendment 7 Contact    Site contacted participant to schedule Re-Consent Visit for Protocol Amendment 7 and to discuss significant changes to the protocol outlined in participant letter dated 10MAY2022. Participant did not answer but a message was left with contact information.     Participant scheduled for Re-consent Visit? not applicable   Re-Consent Visit scheduled for: n/a

## 2022-10-18 ENCOUNTER — DOCUMENTATION ONLY (OUTPATIENT)
Dept: RESEARCH | Facility: HOSPITAL | Age: 62
End: 2022-10-18
Payer: MEDICAID

## 2022-10-18 NOTE — PROGRESS NOTES
Sponsor: [employer]   Study: A Randomized, Double-blind, Placebo-controlled Phase 3 Study to Assess the Efficacy and Safety of Ad26.COV2.S for the Prevention of SARS-CoV-2-mediated COVID-19 in Adults Aged 18 Years and Older   IRB/Protocol #: 2021.025/ [id number]; Phase 3?   Principle Investigator/Sub-Investigator: Dr. Danny Gentile  Site Number: [id number]  Location: [place]  Subject Number: [id number]      Protocol Amendment 7 Contact    Site contacted participant to schedule Re-Consent Visit for Protocol Amendment 7 and to discuss significant changes to the protocol outlined in participant letter dated 10MAY2022. Participant voiced understanding.    Participant scheduled for Re-consent Visit? yes   Re-Consent Visit scheduled for: 10/25/2022

## 2022-10-25 ENCOUNTER — RESEARCH ENCOUNTER (OUTPATIENT)
Dept: RESEARCH | Facility: HOSPITAL | Age: 62
End: 2022-10-25
Payer: MEDICAID

## 2022-10-25 NOTE — PROGRESS NOTES
Sponsor: [employer]   Study: A Randomized, Double-blind, Placebo-controlled Phase 3 Study to Assess the Efficacy and Safety of Ad26.COV2.S for the Prevention of SARS-CoV-2-mediated COVID-19 in Adults Aged 18 Years and Older   IRB/Protocol #: 2021.025/ [id number]; Phase 3?   Principle Investigator/Sub-Investigator: Dr. Danny Gentile  Site Number: [id number]  Location: [place]  Subject Number: [id number]    Protocol Amendment 7 - Unscheduled Re-Consent Visit    Informed Consent:   Is this a reconsent?: yes  Consenting was completed in private area using the most current IRB approved version of the ICF dated 06-JUL-2022  by myself and patient was given ample time to review consent prior to execution of ICF.    Prior to the Informed Consent (IC) being signed, or any study protocol required data collection, testing, procedure, or intervention being performed, the following was done and/or discussed:?   Patient was given a copy of the IC for review??   Purpose of the study and qualifications to participate??   Study design, follow up schedule, and tests or procedures done at each visit?   Confidentiality and HIPAA Authorization for Release of Medical Records for the research trial/ subject's rights/research related injury?   Risk, Benefits, Alternative Treatments, Compensation and Costs?   Participation in the research trial is voluntary and patient may withdraw at anytime?   Contact information for study related questions?     Patient verbalizes understanding of the above: Yes?   Contact information for CRC and PI given to patient: Yes?   Patient able to adequately summarize: the purpose of the study, the risks associated with the study, and all procedures, testing, and follow-ups associated with the study: Yes?     Aden Cierra signed the IRB approved version of informed consent form for the [employer] research study.? Each page of the consent was  reviewed with the patient and patient's family) and all questions answered satisfactorily. The patient signed the paper consent form and received a copy of same (copy of informed consent made and provided to patient). The original consent was scanned into electronic medical records (EPIC).      Next Visit:  Is subject enrolled in the immunogenicity subset? no  If yes, IN-PERSON Visit 9/Week 86 scheduled? no  If yes, when? N/a    If subject is not in the above subset, Participant understands that Visit 9 will be conducted via telephone and site will contact subject the day the window for that visit opens? yes    Subject instructed to delete the Study Hub luisa or return provisional device to site. Subject has had funds added to their ClinCard and visit was updated in [facility].

## 2022-10-26 ENCOUNTER — TELEPHONE (OUTPATIENT)
Dept: CARDIOLOGY | Facility: HOSPITAL | Age: 62
End: 2022-10-26
Payer: MEDICAID

## 2022-11-30 ENCOUNTER — DOCUMENTATION ONLY (OUTPATIENT)
Dept: RESEARCH | Facility: HOSPITAL | Age: 62
End: 2022-11-30
Payer: MEDICAID

## 2022-11-30 NOTE — PROGRESS NOTES
Sponsor: [employer]   Study: A Randomized, Double-blind, Placebo-controlled Phase 3 Study to Assess the Efficacy and Safety of Ad26.COV2.S for the Prevention of SARS-CoV-2-mediated COVID-19 in Adults Aged 18 Years and Older   IRB/Protocol #: 2021.025/ [id number]; Phase 3?   Principle Investigator/Sub-Investigator: Dr. Danny Gentile  Site Number: [id number]  Location: [place]  Subject Number: [id number]      Visit 9/Week 86 Telephone Follow Up    Informed Consent  Does subject need to be re consented at this time? no     Adverse Events:  Has subject experienced any adverse events since prior Visit?: no      Has the subject experienced any COVID-19 Symptoms? no      Concomitant Medications:  Has patient had a change in concomitant medications since prior Visit?: no    Next Visit:  Is subject enrolled in the immunogenicity subset? no  If yes, IN-PERSON Visit 10/Wqaj677 scheduled? no  If yes, when? N/a    If subject is not in the above subset, Participant understands that Visit 10 will be conducted via telephone and site will contact subject the day the window for that visit opens? yes    Subject has had funds added to their [facility]Card and [facility]

## 2022-12-28 ENCOUNTER — TELEPHONE (OUTPATIENT)
Dept: CARDIOLOGY | Facility: CLINIC | Age: 62
End: 2022-12-28
Payer: MEDICAID

## 2022-12-28 NOTE — TELEPHONE ENCOUNTER
Initiated PA for Entresto through CoverMyMeds. Your information has been sent to [facility].

## 2023-05-22 NOTE — PROGRESS NOTES
Subjective:   Patient ID:  Aden Norton is a 59 y.o. male who presents for evaluation of No chief complaint on file.      58 yo male, came in for care establish. Prior cardiologist at [facility], Dr. henri Castrejon  [facility] CAD S/p ICD battery change in . H/o cocaine smoking last time was 3 months ago. No tabacco and alcohol.   Lives in a rehab group house.  Per [facility] records: h/o a large MI in 2009 at [place] which resulted in an EF of 20% with a 4.7 x 3 cm apical clot, LAD stent placement, implanted ([place] [facility]) cardiac defibrillator in [place] in 2011 (no ICD shocks); In 2013 had another LAD stent His 9/15/15 at [facility] (DR Cindy Fermin) showed: Enlarged LV; occluded LAD stent, distal LAD fills retrogradely via LCX, which itself is OK, RCA OK. RA=19/13/12, sats 65%; RV=34/5/11; PA= 28/12; sats 65%Ug=606/75/95 sats 95%; UW=746/15/29; C.I. =1.17 L/min/m2.   No DmMand stroke  Last stress test in    EKG a pacing and PVC  No chest pain, dyspnea palpitation, dizziness faint  Decent exercise  Med compliance  Dependent medical transportation            History reviewed. No pertinent past medical history.    Past Surgical History:   Procedure Laterality Date    INSERT / REPLACE / REMOVE PACEMAKER         Social History     Tobacco Use    Smoking status: Never Smoker    Smokeless tobacco: Never Used   Substance Use Topics    Alcohol use: Never     Frequency: Never    Drug use: Not on file       Family History   Problem Relation Age of Onset    Heart attack Father        Review of Systems   Constitution: Negative for decreased appetite, diaphoresis, fever, malaise/fatigue and night sweats.   HENT: Negative for nosebleeds.    Eyes: Negative for blurred vision and double vision.   Cardiovascular: Negative for chest pain, claudication, dyspnea on exertion, irregular heartbeat, leg swelling, near-syncope, orthopnea, palpitations, paroxysmal nocturnal dyspnea and syncope.   Respiratory:  Negative for cough, shortness of breath, sleep disturbances due to breathing, snoring, sputum production and wheezing.    Endocrine: Negative for cold intolerance and polyuria.   Hematologic/Lymphatic: Does not bruise/bleed easily.   Skin: Negative for rash.   Musculoskeletal: Negative for back pain, falls, joint pain, joint swelling and neck pain.   Gastrointestinal: Negative for abdominal pain, heartburn, nausea and vomiting.   Genitourinary: Negative for dysuria, frequency and hematuria.   Neurological: Negative for difficulty with concentration, dizziness, focal weakness, headaches, light-headedness, numbness, seizures and weakness.   Psychiatric/Behavioral: Negative for depression, memory loss and substance abuse. The patient does not have insomnia.    Allergic/Immunologic: Negative for HIV exposure and hives.       Objective:   Physical Exam   Constitutional: He is oriented to person, place, and time. He appears well-nourished.   HENT:   Head: Normocephalic.   Eyes: Pupils are equal, round, and reactive to light.   Neck: Normal carotid pulses and no JVD present. Carotid bruit is not present. No thyromegaly present.   Cardiovascular: Normal rate, regular rhythm, normal heart sounds and normal pulses.  No extrasystoles are present. PMI is not displaced. Exam reveals no gallop and no S3.   No murmur heard.  Pulmonary/Chest: Breath sounds normal. No stridor. No respiratory distress.   Abdominal: Soft. Bowel sounds are normal. There is no abdominal tenderness. There is no rebound.   Musculoskeletal: Normal range of motion.   Neurological: He is alert and oriented to person, place, and time.   Skin: Skin is intact. No rash noted.   Psychiatric: His behavior is normal.       No results found for: CHOL  No results found for: HDL  No results found for: LDLCALC  No results found for: TRIG  No results found for: CHOLHDL    Chemistry    No results found for: NA, K, CL, CO2, BUN, CREATININE, GLU No results found for:  CALCIUM, ALKPHOS, AST, ALT, BILITOT, ESTGFRAFRICA, EGFRNONAA       No results found for: LABA1C, HGBA1C  No results found for: TSH  No results found for: INR, PROTIME  No results found for: WBC, HGB, HCT, MCV, PLT  BNP  @LABRCNTIP(BNP,BNPTRIAGEBLO)@  CrCl cannot be calculated (No successful lab value found.).  No results found in the last 24 hours.  No results found in the last 24 hours.  No results found in the last 24 hours.    Assessment:      1. Coronary artery disease of native artery of native heart with stable angina pectoris    2. Chronic systolic congestive heart failure    3. LV (left ventricular) mural thrombus following MI    4. ICD (implantable cardioverter-defibrillator) in place    5. Cocaine abuse        Plan:   Coronary artery disease of native artery of native heart with stable angina pectoris  -     Lipid Panel; Future; Expected date: 09/04/2020  -     Comprehensive metabolic panel; Future; Expected date: 09/04/2020  -     CBC auto differential; Future; Expected date: 09/04/2020  -     Echo Color Flow Doppler? Yes; Future    Continue ASA 81mg, eliquis 5 mg bid, entresto coreg and Lipitor  Counseled DASH  Arrange ICD interrogation  Recommend heart-healthy diet, weight control and regular exercise.  Corey. Risk modification.   RTC in 6 months    I have reviewed all pertinent labs and cardiac studies independently. Plans and recommendations have been formulated under my direct supervision. All questions answered and patient voiced understanding.   If symptoms persist go to the ED                   Chronic combined systolic and diastolic heart failure Chronic combined systolic and diastolic heart failure Chronic combined systolic and diastolic heart failure Chronic combined systolic and diastolic heart failure Protein-calorie malnutrition Chronic combined systolic and diastolic heart failure Protein-calorie malnutrition Protein-calorie malnutrition Protein-calorie malnutrition Chronic combined systolic and diastolic heart failure Chronic combined systolic and diastolic heart failure Chronic combined systolic and diastolic heart failure

## 2023-05-24 ENCOUNTER — DOCUMENTATION ONLY (OUTPATIENT)
Dept: RESEARCH | Facility: HOSPITAL | Age: 63
End: 2023-05-24
Payer: MEDICAID

## 2023-05-24 NOTE — PROGRESS NOTES
Sponsor: [employer]   Study: A Randomized, Double-blind, Placebo-controlled Phase 3 Study to Assess the Efficacy and Safety of Ad26.COV2.S for the Prevention of SARS-CoV-2-mediated COVID-19 in Adults Aged 18 Years and Older   IRB/Protocol #: 2021.025/ [id number]; Phase 3?   Principle Investigator/Sub-Investigator: Dr. Danny Gentile  Site Number: [id number]  Location: [place]  Subject Number: [id number]      Visit 10/Week 112 Telephone Follow Up      Informed Consent  Does subject need to be re consented at this time? No       Adverse Events:  Has subject experienced any adverse events since prior Visit?: No      Has the subject experienced any COVID-19 Symptoms? No      Concomitant Medications:  Has patient had a change in concomitant medications since prior Visit?: No      Subject was informed their study participation has ended and that the location has changed. Thanked subject for participation in study.

## 2023-06-28 ENCOUNTER — TELEPHONE (OUTPATIENT)
Dept: CARDIOLOGY | Facility: CLINIC | Age: 63
End: 2023-06-28
Payer: MEDICAID

## 2023-06-28 NOTE — TELEPHONE ENCOUNTER
Spoke with Gaetano in regards to alternative therapy for pt in regards to taking statins for cardiovascular disease.           ----- Message from Gay Lynn sent at 6/28/2023 12:08 PM CDT -----  Contact: Carl/Gaetano Edwards is calling in regard to the pt is not taking statin therapy, pt has diabetes.  Please call carl back at [phone].  Fax # [phone]

## 2023-10-24 RX ORDER — CARVEDILOL 12.5 MG/1
12.5 TABLET ORAL 2 TIMES DAILY
Qty: 180 TABLET | Refills: 2 | Status: SHIPPED | OUTPATIENT
Start: 2023-10-24

## 2024-04-03 RX ORDER — APIXABAN 5 MG/1
5 TABLET, FILM COATED ORAL 2 TIMES DAILY
Qty: 180 TABLET | Refills: 3 | Status: SHIPPED | OUTPATIENT
Start: 2024-04-03